# Patient Record
(demographics unavailable — no encounter records)

---

## 2025-02-04 NOTE — PHYSICAL EXAM
[Delayed in the Right Toes] : capillary refills delayed in the right toes [Delayed in the Left Toes] : capillary refills delayed in the left toes [1+] : left foot dorsalis pedis 1+ [Diminished Throughout Right Foot] : diminished sensation with monofilament testing throughout right foot [Diminished Throughout Left Foot] : diminished sensation with monofilament testing throughout left foot [Ankle Swelling (On Exam)] : not present [Varicose Veins Of Lower Extremities] : not present [] : not present [FreeTextEntry1] : Eschar ulceration to left heel with periwound erythema. minimal drainage.

## 2025-02-04 NOTE — END OF VISIT
[] : Resident [FreeTextEntry3] : Left plantar lateral ulcer with dry gangrenous patch.  Mild surrounding erythema with approximately 1 cm extension. Rx Keflex Referred for vascular evaluation Follow-up 2 weeks

## 2025-02-04 NOTE — HISTORY OF PRESENT ILLNESS
[Wheelchair] : a wheelchair [FreeTextEntry1] : 94F with dementia with left heel ulceration for 1 month. Patient is bedbound. Here with her daughters.

## 2025-02-04 NOTE — ASSESSMENT
[Verbal] : verbal [Patient] : patient [Good - alert, interested, motivated] : Good - alert, interested, motivated [FreeTextEntry1] : Decubitus ulceration of left heel with eschar wound base  Rx; keflex 500 BID for 7 days Betadine soaked gauze and offloading Amlactin 12% cream prn  Vascular referral   RTC 1 month

## 2025-03-03 NOTE — HISTORY OF PRESENT ILLNESS
[Wheelchair] : a wheelchair [FreeTextEntry1] : 94F with dementia with left heel ulceration for 1 month. Patient is bedbound. Here with her daughter.

## 2025-03-03 NOTE — END OF VISIT
[] : Resident [FreeTextEntry3] : Left heel improving.  No erythema.  Dry hyperkeratotic ulcer rim.  Ulcer does not probe to bone Continue with local wound care Follow-up4 weeks

## 2025-03-03 NOTE — ASSESSMENT
[Verbal] : verbal [Patient] : patient [Good - alert, interested, motivated] : Good - alert, interested, motivated [FreeTextEntry1] : Decubitus ulceration of left heel with eschar wound base  Sharp debridement using a sterile dermal curette down to healthy bleeding tissue at the level of the superficial fascia Betadine-soaked gauze and offloading RTC 1 month

## 2025-04-10 NOTE — END OF VISIT
[] : Resident [FreeTextEntry3] : Aseptic debridement of hyperkeratosis performed Offloading heel boot to prevent reulceration Return 3 months

## 2025-04-10 NOTE — ASSESSMENT
[Verbal] : verbal [Patient] : patient [Good - alert, interested, motivated] : Good - alert, interested, motivated [FreeTextEntry1] : Decubitus ulceration of left heel with eschar wound base  Sharp debridement using a sterile dermal curette down to healthy bleeding tissue at the level of the superficial tissue - No need for LWC, wound fully closed - Advised pt to return in 3 months for routine nail f/u

## 2025-04-10 NOTE — PHYSICAL EXAM
[Delayed in the Right Toes] : capillary refills delayed in the right toes [Delayed in the Left Toes] : capillary refills delayed in the left toes [1+] : left foot dorsalis pedis 1+ [Diminished Throughout Right Foot] : diminished sensation with monofilament testing throughout right foot [Diminished Throughout Left Foot] : diminished sensation with monofilament testing throughout left foot [Ankle Swelling (On Exam)] : not present [Varicose Veins Of Lower Extremities] : not present [] : not present [FreeTextEntry1] : Left heel hyperkeratotic tissue over region of prior ulcer formation

## 2025-04-14 NOTE — PHYSICAL EXAM
[No Acute Distress] : no acute distress [Well Nourished] : well nourished [Well-Appearing] : well-appearing [Normal Sclera/Conjunctiva] : normal sclera/conjunctiva [Normal Outer Ear/Nose] : the outer ears and nose were normal in appearance [No Respiratory Distress] : no respiratory distress  [No Accessory Muscle Use] : no accessory muscle use [Clear to Auscultation] : lungs were clear to auscultation bilaterally [Normal Rate] : normal rate  [Regular Rhythm] : with a regular rhythm [No Edema] : there was no peripheral edema [Soft] : abdomen soft [Non Tender] : non-tender [Non-distended] : non-distended [de-identified] : Elderly appearing thin female in a wheelchair, calm and comfortable [de-identified] : Spontaneously moving all extremities slowly

## 2025-04-14 NOTE — ASSESSMENT
[FreeTextEntry1] : #Chronic ulcer of L foot - Following podiatry. - Ulcer noted to now be mainly a clear dry scab at L heel, no discharge or pain. - Daughter doing local wound care, visiting RN services are complete for now per daughter.  #Constipation - Daughter reports constipation, states milk of mag worked --> Rx given.  #Dry eyes - Refresh Tears Rx given.  #Dementia, end stage, requires assistance with all ADLs, incontinence of B/B - AA*00 - responds to simple commands - pleasure feeds and medications as tolerated - family referred to hospice care previously - Patient can walk a few steps per daughter, uses wheelchair otherwise  #DM #HTN - Per daughter, all meds have been stopped.  #HCM - RTC in 6 months

## 2025-04-14 NOTE — REVIEW OF SYSTEMS
[Dryness] : dryness  [Fever] : no fever [Nasal Discharge] : no nasal discharge [FreeTextEntry5] : Unable to assess as patient is nonverbal [FreeTextEntry6] : Unable to assess as patient is nonverbal [FreeTextEntry7] : Unable to assess as patient is nonverbal [FreeTextEntry8] : Unable to assess as patient is nonverbal [FreeTextEntry9] : Unable to assess as patient is nonverbal [de-identified] : Nonverbal at baseline per daughter

## 2025-05-04 NOTE — HEALTH RISK ASSESSMENT
[No] : In the past 12 months have you used drugs other than those required for medical reasons? No [No falls in past year] : Patient reported no falls in the past year [0] : 2) Feeling down, depressed, or hopeless: Not at all (0) [PHQ-2 Negative - No further assessment needed] : PHQ-2 Negative - No further assessment needed [Never] : Never [PIM2Fitvj] : 0 [With Patient/Caregiver] : , with patient/caregiver [AdvancecareDate] : 04/28 [FreeTextEntry4] : Discussed the importance of having a healthcare proxy to ensure that medical decisions are made according to the patient's wishes if they are unable to communicate. Encouraged patient to follow up with their primary care provider at the next visit to discuss this further. Patient can complete the form at the PCP office or download it from the [place] website.

## 2025-05-04 NOTE — PHYSICAL EXAM
[de-identified] : Telehealth precludes traditional, comprehensive physical exam. Patient appeared stable and alert.

## 2025-05-04 NOTE — PHYSICAL EXAM
[de-identified] : Telehealth precludes traditional, comprehensive physical exam. Patient appeared stable and alert.

## 2025-05-04 NOTE — PHYSICAL EXAM
[de-identified] : Telehealth precludes traditional, comprehensive physical exam. Patient appeared stable and alert.

## 2025-05-04 NOTE — HISTORY OF PRESENT ILLNESS
[Home] : at home, [unfilled] , at the time of the visit. [Other Location: e.g. Home (Enter Location, City,State)___] : at [unfilled] [Telephone (audio)] : This telephonic visit was provided via audio only technology. [Family Member] : family member [No access to tele-video equipment] : patient lacks access to tele-video equipment. [Verbal consent obtained from patient] : the patient, [unfilled] [FreeTextEntry3] : daughter Souhiar [de-identified] : [facility] quality initiative provider note: 94 year old female with history of multiple medical issues. Denies hospitalization, surgery, or new medical condition including COVID in the past year. Patient is being seen today to confirm diagnoses reported from [facility]. Patient report doing well. No complaints offered at this time. No acute distress. Denies any symptoms or concerns at this time. Daughter verified medications and medical diagnosis. Report taking medications as prescribed.

## 2025-05-04 NOTE — HEALTH RISK ASSESSMENT
[No] : In the past 12 months have you used drugs other than those required for medical reasons? No [No falls in past year] : Patient reported no falls in the past year [0] : 2) Feeling down, depressed, or hopeless: Not at all (0) [PHQ-2 Negative - No further assessment needed] : PHQ-2 Negative - No further assessment needed [Never] : Never [[id number]] : 0 [With Patient/Caregiver] : , with patient/caregiver [AdvancecareDate] : 04/28 [FreeTextEntry4] : Discussed the importance of having a healthcare proxy to ensure that medical decisions are made according to the patient's wishes if they are unable to communicate. Encouraged patient to follow up with their primary care provider at the next visit to discuss this further. Patient can complete the form at the PCP office or download it from the [place] website.

## 2025-05-04 NOTE — HISTORY OF PRESENT ILLNESS
[Home] : at home, [unfilled] , at the time of the visit. [Other Location: e.g. Home (Enter Location, City,State)___] : at [unfilled] [Telephone (audio)] : This telephonic visit was provided via audio only technology. [Family Member] : family member [No access to tele-video equipment] : patient lacks access to tele-video equipment. [Verbal consent obtained from patient] : the patient, [unfilled] [FreeTextEntry3] : daughter Souhiar [de-identified] : [facility] quality initiative provider note: 94 year old female with history of multiple medical issues. Denies hospitalization, surgery, or new medical condition including COVID in the past year. Patient is being seen today to confirm diagnoses reported from [facility]. Patient report doing well. No complaints offered at this time. No acute distress. Denies any symptoms or concerns at this time. Daughter verified medications and medical diagnosis. Report taking medications as prescribed.

## 2025-05-04 NOTE — HEALTH RISK ASSESSMENT
[No] : In the past 12 months have you used drugs other than those required for medical reasons? No [No falls in past year] : Patient reported no falls in the past year [0] : 2) Feeling down, depressed, or hopeless: Not at all (0) [PHQ-2 Negative - No further assessment needed] : PHQ-2 Negative - No further assessment needed [Never] : Never [[id number]] : 0 [With Patient/Caregiver] : , with patient/caregiver [AdvancecareDate] : 04/28 [FreeTextEntry4] : Discussed the importance of having a healthcare proxy to ensure that medical decisions are made according to the patient's wishes if they are unable to communicate. Encouraged patient to follow up with their primary care provider at the next visit to discuss this further. Patient can complete the form at the PCP office or download it from the [place] website.

## 2025-07-07 NOTE — PHYSICAL EXAM
[Ankle Swelling (On Exam)] : not present [Varicose Veins Of Lower Extremities] : not present [] : not present [Delayed in the Right Toes] : capillary refills delayed in the right toes [Delayed in the Left Toes] : capillary refills delayed in the left toes [1+] : left foot dorsalis pedis 1+ [FreeTextEntry1] : thick dystrophic nails w/ discoloration x 10 [Diminished Throughout Right Foot] : diminished sensation with monofilament testing throughout right foot [Diminished Throughout Left Foot] : diminished sensation with monofilament testing throughout left foot

## 2025-07-07 NOTE — ASSESSMENT
[FreeTextEntry1] : -nails debrided to patients tolerance return 6 months [Verbal] : verbal [Patient] : patient [Good - alert, interested, motivated] : Good - alert, interested, motivated

## 2025-07-07 NOTE — HISTORY OF PRESENT ILLNESS
[Wheelchair] : a wheelchair [FreeTextEntry1] : 95F with dementia  presents for management of nail dystrophy